# Patient Record
Sex: FEMALE | Race: OTHER | HISPANIC OR LATINO | URBAN - METROPOLITAN AREA
[De-identification: names, ages, dates, MRNs, and addresses within clinical notes are randomized per-mention and may not be internally consistent; named-entity substitution may affect disease eponyms.]

---

## 2018-09-11 ENCOUNTER — EMERGENCY (EMERGENCY)
Facility: HOSPITAL | Age: 38
LOS: 1 days | Discharge: HOME | End: 2018-09-11
Attending: STUDENT IN AN ORGANIZED HEALTH CARE EDUCATION/TRAINING PROGRAM
Payer: COMMERCIAL

## 2018-09-11 VITALS
OXYGEN SATURATION: 99 % | WEIGHT: 139.99 LBS | TEMPERATURE: 98 F | HEIGHT: 59.84 IN | SYSTOLIC BLOOD PRESSURE: 152 MMHG | DIASTOLIC BLOOD PRESSURE: 83 MMHG | HEART RATE: 98 BPM | RESPIRATION RATE: 18 BRPM

## 2018-09-11 PROCEDURE — 96375 TX/PRO/DX INJ NEW DRUG ADDON: CPT

## 2018-09-11 PROCEDURE — 99284 EMERGENCY DEPT VISIT MOD MDM: CPT | Mod: 25

## 2018-09-11 PROCEDURE — 99284 EMERGENCY DEPT VISIT MOD MDM: CPT

## 2018-09-11 PROCEDURE — T1013: CPT

## 2018-09-11 PROCEDURE — 96374 THER/PROPH/DIAG INJ IV PUSH: CPT

## 2018-09-11 RX ORDER — FAMOTIDINE 10 MG/ML
20 INJECTION INTRAVENOUS ONCE
Qty: 0 | Refills: 0 | Status: COMPLETED | OUTPATIENT
Start: 2018-09-11 | End: 2018-09-11

## 2018-09-11 RX ORDER — DIPHENHYDRAMINE HCL 50 MG
25 CAPSULE ORAL ONCE
Qty: 0 | Refills: 0 | Status: COMPLETED | OUTPATIENT
Start: 2018-09-11 | End: 2018-09-11

## 2018-09-11 RX ADMIN — FAMOTIDINE 20 MILLIGRAM(S): 10 INJECTION INTRAVENOUS at 18:44

## 2018-09-11 RX ADMIN — Medication 125 MILLIGRAM(S): at 18:44

## 2018-09-11 RX ADMIN — Medication 25 MILLIGRAM(S): at 18:44

## 2018-09-11 NOTE — ED STATDOCS - MEDICAL DECISION MAKING DETAILS
Will treat with steroids, antihistamines, once pt is better we will discharge with outpatient follow up, she was advised to discontinue use of estrogen medication and follow up with GYN for medication adjustment.

## 2018-09-11 NOTE — ED STATDOCS - PROGRESS NOTE DETAILS
ANA PAULA JACKSON: PT evaluated by intake physician. HPI/PE/ROS as noted above. Will follow up plan per intake physician   pt feeling much better after medication  rash dissipating.

## 2018-09-11 NOTE — ED ADULT TRIAGE NOTE - CHIEF COMPLAINT QUOTE
'I have been using a new soap since Saturday but I don't think its the soap it might be this new medication I am taking (pt taking a new birth control Junel) I don't know but I am very itchy and feels hot" Pt denies SOB.

## 2018-09-11 NOTE — ED STATDOCS - OBJECTIVE STATEMENT
37 y/o F pt with hx of no significant medical hx  presents to ED c/o a red rash on her b/l UEs, trunk, abdomen and b/l LEs, onset around 1-2 am this morning that is worsening intermittent traveling erythremic hives. The pt went to the doctor went for papilloma exam and was given estrogen pills that she started 6 days ago, been taking every day since. Denies new animals, new creams, difficulty swallowing, difficulty breathing, SOB, throat erythema. 39 y/o F pt with hx of no significant medical hx  presents to ED c/o a red rash on her b/l UEs, trunk, abdomen and b/l LEs, onset around 1-2 am this morning that is worsening intermittent hives. The pt went to the doctor went for papilloma exam and was given estrogen pills that she started 6 days ago, been taking every day since. Denies new animals, new creams, difficulty swallowing, difficulty breathing, SOB, throat erythema.

## 2018-09-11 NOTE — ED STATDOCS - NS_ ATTENDINGSCRIBEDETAILS _ED_A_ED_FT
I, [place], performed the initial face to face bedside interview with this patient regarding history of present illness, review of symptoms and relevant past medical, social and family history.  I completed an independent physical examination.  I was the initial provider who evaluated this patient.  The history, relevant review of systems, past medical and surgical history, medical decision making, and physical examination was documented by the scribe in my presence and I attest to the accuracy of the documentation.  I have signed out the follow up of any pending tests (i.e. labs, radiological studies) to the ACP.  I have communicated the patient’s plan of care and disposition with the ACP.

## 2018-09-12 RX ORDER — DIPHENHYDRAMINE HCL 50 MG
1 CAPSULE ORAL
Qty: 16 | Refills: 0 | OUTPATIENT
Start: 2018-09-12 | End: 2018-09-15

## 2018-09-12 RX ORDER — EPINEPHRINE 0.3 MG/.3ML
0.3 INJECTION INTRAMUSCULAR; SUBCUTANEOUS
Qty: 1 | Refills: 0 | OUTPATIENT
Start: 2018-09-12

## 2018-09-12 RX ORDER — FAMOTIDINE 10 MG/ML
1 INJECTION INTRAVENOUS
Qty: 10 | Refills: 0 | OUTPATIENT
Start: 2018-09-12 | End: 2018-09-16

## 2018-09-18 ENCOUNTER — EMERGENCY (EMERGENCY)
Facility: HOSPITAL | Age: 38
LOS: 1 days | Discharge: HOME | End: 2018-09-18
Attending: EMERGENCY MEDICINE
Payer: COMMERCIAL

## 2018-09-18 VITALS
OXYGEN SATURATION: 97 % | HEART RATE: 87 BPM | SYSTOLIC BLOOD PRESSURE: 118 MMHG | DIASTOLIC BLOOD PRESSURE: 81 MMHG | RESPIRATION RATE: 20 BRPM | TEMPERATURE: 98 F

## 2018-09-18 VITALS — WEIGHT: 160.06 LBS | HEIGHT: 57.09 IN

## 2018-09-18 PROCEDURE — 99282 EMERGENCY DEPT VISIT SF MDM: CPT

## 2018-09-18 PROCEDURE — 99283 EMERGENCY DEPT VISIT LOW MDM: CPT | Mod: 25

## 2018-09-18 RX ORDER — DIPHENHYDRAMINE HCL 50 MG
50 CAPSULE ORAL ONCE
Qty: 0 | Refills: 0 | Status: COMPLETED | OUTPATIENT
Start: 2018-09-18 | End: 2018-09-18

## 2018-09-18 RX ADMIN — Medication 50 MILLIGRAM(S): at 05:47

## 2018-09-18 NOTE — ED PROVIDER NOTE - ATTENDING CONTRIBUTION TO CARE
I personally saw the patient with the PA, and completed the key components of the history and physical exam. I then discussed the management plan with the PA.   gen in nad resp clear cardaic nomurmur abd soft skin mild pruritic macular rash hands feet with pruritus non toxic   benadryl f.u derm

## 2018-09-18 NOTE — ED ADULT NURSE NOTE - CHPI ED NUR SYMPTOMS NEG
no vomiting/no nausea/no difficulty breathing/no shortness of breath/no wheezing/no congestion/no swelling of face, tongue/no difficulty swallowing

## 2018-09-18 NOTE — ED PROVIDER NOTE - OBJECTIVE STATEMENT
38 year old female was previously in ER for rash after starting estrogen pill. pt was prescribed prednisone and benadryl and instructed to FU with dermatology and allergist. pt states that the medication she was taking was keeping the rash under control but that yesterday the rash became itchy and that she felt like her throat was scratchy. pt was started on another new form of contraception. pt denies difficulty breathing or chest pain. pt has not followed up with dermatology or PMD or allergist. appointment today.

## 2018-09-18 NOTE — ED PROVIDER NOTE - PHYSICAL EXAMINATION
SKIN: macular papular raised blanching rash, diffuse extending from chest down bilateral extremities. no excoriations or bleeding. no signs of cellulitic process   MOUTH: lips without angioedema. tongue NOT swollen. oropharnyx unremarkable.

## 2021-01-14 PROBLEM — Z00.00 ENCOUNTER FOR PREVENTIVE HEALTH EXAMINATION: Status: ACTIVE | Noted: 2021-01-14

## 2021-01-15 ENCOUNTER — APPOINTMENT (OUTPATIENT)
Dept: OBGYN | Facility: CLINIC | Age: 41
End: 2021-01-15
Payer: COMMERCIAL

## 2021-01-15 VITALS
DIASTOLIC BLOOD PRESSURE: 64 MMHG | WEIGHT: 170 LBS | BODY MASS INDEX: 32.1 KG/M2 | HEIGHT: 61 IN | TEMPERATURE: 97.2 F | SYSTOLIC BLOOD PRESSURE: 108 MMHG

## 2021-01-15 DIAGNOSIS — Z78.9 OTHER SPECIFIED HEALTH STATUS: ICD-10-CM

## 2021-01-15 DIAGNOSIS — N92.6 IRREGULAR MENSTRUATION, UNSPECIFIED: ICD-10-CM

## 2021-01-15 DIAGNOSIS — Z01.419 ENCOUNTER FOR GYNECOLOGICAL EXAMINATION (GENERAL) (ROUTINE) W/OUT ABNORMAL FINDINGS: ICD-10-CM

## 2021-01-15 DIAGNOSIS — R10.9 UNSPECIFIED ABDOMINAL PAIN: ICD-10-CM

## 2021-01-15 DIAGNOSIS — N64.4 MASTODYNIA: ICD-10-CM

## 2021-01-15 PROCEDURE — 99385 PREV VISIT NEW AGE 18-39: CPT

## 2021-01-15 PROCEDURE — 99072 ADDL SUPL MATRL&STAF TM PHE: CPT

## 2021-01-15 NOTE — REVIEW OF SYSTEMS
[Abdominal Pain] : abdominal pain [Breast Pain] : breast pain [Headache] : headache [Negative] : Heme/Lymph [Patient Intake Form Reviewed] : Patient intake form was reviewed

## 2021-01-15 NOTE — HISTORY OF PRESENT ILLNESS
[Patient reported PAP Smear was normal] : Patient reported PAP Smear was normal [N] : Patient does not use contraception [Y] : Positive pregnancy history [Menarche Age: ____] : age at menarche was [unfilled] [Currently Active] : currently active [Men] : men [Vaginal] : vaginal [No] : No [PapSmeardate] : 2019 [TextBox_31] : per pt [LMPDate] : 12/15/20 [PGHxTotal] : 2 [BannerxFulerm] : 1 [[facility] Center[facility]] : 1 [PGHxABSpont] : 1 [FreeTextEntry1] : 12/12/20

## 2021-01-16 LAB — HPV HIGH+LOW RISK DNA PNL CVX: NOT DETECTED

## 2021-01-27 LAB — CYTOLOGY CVX/VAG DOC THIN PREP: ABNORMAL
